# Patient Record
Sex: FEMALE | Race: WHITE | Employment: OTHER
[De-identification: names, ages, dates, MRNs, and addresses within clinical notes are randomized per-mention and may not be internally consistent; named-entity substitution may affect disease eponyms.]

---

## 2018-02-16 PROBLEM — I34.0 MITRAL VALVE INSUFFICIENCY: Status: ACTIVE | Noted: 2018-02-16

## 2018-02-16 PROBLEM — R93.1 ABNORMAL ECHOCARDIOGRAM: Status: ACTIVE | Noted: 2018-02-16

## 2018-02-16 PROBLEM — E78.1 HYPERTRIGLYCERIDEMIA: Status: ACTIVE | Noted: 2018-02-16

## 2018-02-16 PROBLEM — I10 ESSENTIAL HYPERTENSION: Status: ACTIVE | Noted: 2018-02-16

## 2018-02-16 PROBLEM — R00.2 PALPITATION: Status: ACTIVE | Noted: 2018-02-16

## 2018-02-16 PROBLEM — R94.31 ABNORMAL EKG: Status: ACTIVE | Noted: 2018-02-16

## 2018-02-16 PROBLEM — I49.9 CARDIAC ARRHYTHMIA: Status: ACTIVE | Noted: 2018-02-16

## 2023-09-05 ENCOUNTER — HOSPITAL ENCOUNTER (OUTPATIENT)
Dept: MRI IMAGING | Facility: CLINIC | Age: 78
Discharge: HOME | End: 2023-09-07
Payer: COMMERCIAL

## 2023-09-05 DIAGNOSIS — G93.9 DISORDER OF BRAIN: ICD-10-CM

## 2023-09-05 PROCEDURE — 70551 MRI BRAIN STEM W/O DYE: CPT

## 2024-05-09 NOTE — PROGRESS NOTES
[facility]  [address] [place] [id number]  Phone: [phone]       Name: Yuki Winkler  : 1945     Chief Complaint:    Yuki Winkler is a 78 y.o. year old female who presents today for No chief complaint on file.      History of Present Illness:    Patient here to establish care as a new patient.   Previous PCP: ***  Last appt with previous PCP ***  Last preventative visit with previous PCP ***   Specialists: duran Seth   Records reviewed from: ***  OARRS report reviewed: ***    Chronic conditions and associated medications. Taken from my review of the electronic chart, discussion with patient, and any records available to me at the time of visit and prior to the visit:  ***    Acute or new concerns today that the patient has:   ***      Medications:    Outpatient Medications Prior to Visit   Medication Sig Dispense Refill    amLODIPine (NORVASC) 2.5 MG tablet Take 1 tablet by mouth daily 30 tablet 3    Insulin Glargine (BASAGLAR KWIKPEN SC) Inject into the skin      Insulin Aspart (NOVOLOG FLEXPEN SC) Inject into the skin      dicyclomine (BENTYL) 20 MG tablet Take 20 mg by mouth as needed      lisinopril (PRINIVIL;ZESTRIL) 40 MG tablet Take 1 tablet by mouth daily 90 tablet 3    glimepiride (AMARYL) 4 MG tablet Take 4 mg by mouth 2 times daily      pantoprazole (PROTONIX) 40 MG tablet Take 40 mg by mouth daily      nadolol (CORGARD) 20 MG tablet Take 20 mg by mouth daily      b complex vitamins capsule Take 1 capsule by mouth daily       No facility-administered medications prior to visit.       Review of Systems:     Review of Systems     Physical Exam:     Vitals:  There were no vitals taken for this visit. There is no height or weight on file to calculate BMI.    Physical Exam    Assessment:     {No diagnosis found. (Refresh or delete this SmartLink)}          Plan:     {There are no diagnoses linked to this encounter. (Refresh

## 2024-05-10 ENCOUNTER — OFFICE VISIT (OUTPATIENT)
Dept: PRIMARY CARE CLINIC | Age: 79
End: 2024-05-10
Payer: COMMERCIAL

## 2024-05-10 VITALS
HEART RATE: 87 BPM | WEIGHT: 197 LBS | HEIGHT: 61 IN | BODY MASS INDEX: 37.19 KG/M2 | SYSTOLIC BLOOD PRESSURE: 142 MMHG | DIASTOLIC BLOOD PRESSURE: 84 MMHG | OXYGEN SATURATION: 96 %

## 2024-05-10 DIAGNOSIS — E11.9 TYPE 2 DIABETES MELLITUS WITHOUT COMPLICATION, WITH LONG-TERM CURRENT USE OF INSULIN (HCC): Primary | ICD-10-CM

## 2024-05-10 DIAGNOSIS — Z79.4 TYPE 2 DIABETES MELLITUS WITHOUT COMPLICATION, WITH LONG-TERM CURRENT USE OF INSULIN (HCC): Primary | ICD-10-CM

## 2024-05-10 DIAGNOSIS — E83.42 HYPOMAGNESEMIA: ICD-10-CM

## 2024-05-10 DIAGNOSIS — E53.8 B12 DEFICIENCY: ICD-10-CM

## 2024-05-10 DIAGNOSIS — I10 ESSENTIAL HYPERTENSION: ICD-10-CM

## 2024-05-10 PROCEDURE — 1123F ACP DISCUSS/DSCN MKR DOCD: CPT | Performed by: FAMILY MEDICINE

## 2024-05-10 PROCEDURE — 99204 OFFICE O/P NEW MOD 45 MIN: CPT | Performed by: FAMILY MEDICINE

## 2024-05-10 PROCEDURE — 3079F DIAST BP 80-89 MM HG: CPT | Performed by: FAMILY MEDICINE

## 2024-05-10 PROCEDURE — 3077F SYST BP >= 140 MM HG: CPT | Performed by: FAMILY MEDICINE

## 2024-05-10 RX ORDER — AMLODIPINE BESYLATE 5 MG/1
5 TABLET ORAL DAILY
COMMUNITY
Start: 2024-03-06

## 2024-05-10 RX ORDER — PEN NEEDLE, DIABETIC 32GX 5/32"
NEEDLE, DISPOSABLE MISCELLANEOUS
COMMUNITY
Start: 2024-04-01

## 2024-05-10 RX ORDER — IBUPROFEN 200 MG
200 TABLET ORAL EVERY 6 HOURS PRN
COMMUNITY

## 2024-05-10 RX ORDER — ACYCLOVIR 400 MG/1
TABLET ORAL
COMMUNITY
Start: 2024-03-27

## 2024-05-10 RX ORDER — SYRINGE WITH NEEDLE, 1 ML 25GX5/8"
SYRINGE, EMPTY DISPOSABLE MISCELLANEOUS
COMMUNITY
Start: 2024-04-29

## 2024-05-10 RX ORDER — CYANOCOBALAMIN 1000 UG/ML
INJECTION, SOLUTION INTRAMUSCULAR; SUBCUTANEOUS
COMMUNITY
Start: 2024-04-25

## 2024-05-10 RX ORDER — INSULIN GLARGINE 100 [IU]/ML
45 INJECTION, SOLUTION SUBCUTANEOUS NIGHTLY
COMMUNITY
Start: 2024-04-05

## 2024-05-10 RX ORDER — CALCIUM CITRATE/VITAMIN D3 200MG-6.25
TABLET ORAL
COMMUNITY
Start: 2024-04-25

## 2024-05-10 RX ORDER — INSULIN ASPART INJECTION 100 [IU]/ML
INJECTION, SOLUTION SUBCUTANEOUS
COMMUNITY
Start: 2024-04-16

## 2024-05-10 RX ORDER — MELATONIN 10 MG
TABLET, SUBLINGUAL SUBLINGUAL
COMMUNITY

## 2024-05-10 RX ORDER — MAGNESIUM GLUCONATE 27 MG(500)
500 TABLET ORAL 2 TIMES DAILY
COMMUNITY

## 2024-05-10 RX ORDER — LISINOPRIL 10 MG/1
10 TABLET ORAL 2 TIMES DAILY
COMMUNITY
Start: 2024-03-01

## 2024-05-10 SDOH — ECONOMIC STABILITY: FOOD INSECURITY: WITHIN THE PAST 12 MONTHS, YOU WORRIED THAT YOUR FOOD WOULD RUN OUT BEFORE YOU GOT MONEY TO BUY MORE.: NEVER TRUE

## 2024-05-10 SDOH — ECONOMIC STABILITY: INCOME INSECURITY: HOW HARD IS IT FOR YOU TO PAY FOR THE VERY BASICS LIKE FOOD, HOUSING, MEDICAL CARE, AND HEATING?: NOT HARD AT ALL

## 2024-05-10 SDOH — ECONOMIC STABILITY: FOOD INSECURITY: WITHIN THE PAST 12 MONTHS, THE FOOD YOU BOUGHT JUST DIDN'T LAST AND YOU DIDN'T HAVE MONEY TO GET MORE.: NEVER TRUE

## 2024-05-10 SDOH — ECONOMIC STABILITY: HOUSING INSECURITY
IN THE LAST 12 MONTHS, WAS THERE A TIME WHEN YOU DID NOT HAVE A STEADY PLACE TO SLEEP OR SLEPT IN A SHELTER (INCLUDING NOW)?: NO

## 2024-05-10 ASSESSMENT — PATIENT HEALTH QUESTIONNAIRE - PHQ9
SUM OF ALL RESPONSES TO PHQ QUESTIONS 1-9: 0
SUM OF ALL RESPONSES TO PHQ QUESTIONS 1-9: 0
2. FEELING DOWN, DEPRESSED OR HOPELESS: NOT AT ALL
SUM OF ALL RESPONSES TO PHQ QUESTIONS 1-9: 0
1. LITTLE INTEREST OR PLEASURE IN DOING THINGS: NOT AT ALL
SUM OF ALL RESPONSES TO PHQ QUESTIONS 1-9: 0
SUM OF ALL RESPONSES TO PHQ9 QUESTIONS 1 & 2: 0

## 2024-05-10 NOTE — PROGRESS NOTES
[facility]  [address] [place] [id number]  Phone: [phone]       Name: Yuki Winkler  : 1945     Chief Complaint:    Yuki Winkler is a 78 y.o. year old female who presents today for   Chief Complaint   Patient presents with    New Patient     Last PCP-Dr Pop Walker, last seen X 2 weeks ago. Last physical 2023. Specialist- Endo- Dr Rodriguez, Cardiology- Dr Tess Lechuga, GI- Dr Dey          History of Present Illness:    Patient here to establish care as a new patient.   Previous PCP: DR Pop Walker  Last appt with previous PCP 2 weeks ago  Last preventative visit with previous PCP believes around 2023   Specialists: endo - Michael Ochoa, ortho - Rodríguez, cardio - Tess Lechuga, gi - George        Chronic conditions and associated medications. Taken from my review of the electronic chart, discussion with patient, and any records available to me at the time of visit and prior to the visit:    DM-2: basal/bolus insulin regimen, metformin and glimepiride. Last A1c was 8.2%     HTN - amlodipine, lisinopril. No CAD. Did have some heart testing .     Father had colon cancer and she has IBS - sees GI for this. Does admit to some GI complaints and wonders if she should d/w GI.     Mg and B12 - sounds like previous PCP was monitoring/replacing.       Medications:    Outpatient Medications Prior to Visit   Medication Sig Dispense Refill    lisinopril (PRINIVIL;ZESTRIL) 10 MG tablet Take 1 tablet by mouth 2 times daily      BASAGLAR KWIKPEN 100 UNIT/ML injection pen Inject 45 Units into the skin nightly      amLODIPine (NORVASC) 5 MG tablet Take 1 tablet by mouth daily      B-D 3CC LUER-LUCITA SYR 23GX1\" 23G X 1\" 3 ML MISC USE TO INJECT B12 AS DIRECTED      metFORMIN (GLUCOPHAGE) 500 MG tablet 2 tablets      BD PEN NEEDLE PREETHI 2ND GEN 32G X 4 MM MISC USE AS DIRECTED three times a day      FIASP FLEXTOUCH 100 UNIT/ML SOPN inject 5 units subcutaneously

## 2024-05-23 PROBLEM — L90.0 LICHEN SCLEROSUS ET ATROPHICUS: Status: ACTIVE | Noted: 2024-05-23

## 2024-07-22 ENCOUNTER — HOSPITAL ENCOUNTER (OUTPATIENT)
Age: 79
Setting detail: SPECIMEN
Discharge: HOME | End: 2024-07-22

## 2024-07-22 ENCOUNTER — OFFICE VISIT (OUTPATIENT)
Dept: PRIMARY CARE CLINIC | Age: 79
End: 2024-07-22
Payer: COMMERCIAL

## 2024-07-22 VITALS
DIASTOLIC BLOOD PRESSURE: 82 MMHG | WEIGHT: 196 LBS | SYSTOLIC BLOOD PRESSURE: 132 MMHG | BODY MASS INDEX: 37 KG/M2 | HEART RATE: 94 BPM | OXYGEN SATURATION: 97 % | HEIGHT: 61 IN

## 2024-07-22 DIAGNOSIS — R09.82 POST-NASAL DRIP: ICD-10-CM

## 2024-07-22 DIAGNOSIS — E53.8 B12 DEFICIENCY: ICD-10-CM

## 2024-07-22 DIAGNOSIS — R42 DIZZINESS: ICD-10-CM

## 2024-07-22 DIAGNOSIS — E83.42 HYPOMAGNESEMIA: ICD-10-CM

## 2024-07-22 DIAGNOSIS — H53.9 VISION CHANGES: Primary | ICD-10-CM

## 2024-07-22 LAB
MAGNESIUM SERPL-MCNC: 1.9 MG/DL (ref 1.6–2.4)
VIT B12 SERPL-MCNC: 1026 PG/ML (ref 232–1245)

## 2024-07-22 PROCEDURE — 3079F DIAST BP 80-89 MM HG: CPT | Performed by: FAMILY MEDICINE

## 2024-07-22 PROCEDURE — 1123F ACP DISCUSS/DSCN MKR DOCD: CPT | Performed by: FAMILY MEDICINE

## 2024-07-22 PROCEDURE — 3075F SYST BP GE 130 - 139MM HG: CPT | Performed by: FAMILY MEDICINE

## 2024-07-22 PROCEDURE — 99214 OFFICE O/P EST MOD 30 MIN: CPT | Performed by: FAMILY MEDICINE

## 2024-07-22 RX ORDER — LISINOPRIL 20 MG/1
TABLET ORAL
COMMUNITY
Start: 2024-07-12

## 2024-07-22 RX ORDER — CLOBETASOL PROPIONATE 0.5 MG/G
CREAM TOPICAL
Qty: 45 G | Refills: 2 | Status: SHIPPED | OUTPATIENT
Start: 2024-07-22

## 2024-07-22 RX ORDER — NYSTATIN 100000 [USP'U]/G
POWDER TOPICAL
Qty: 30 G | Refills: 2 | Status: SHIPPED | OUTPATIENT
Start: 2024-07-22

## 2024-07-22 RX ORDER — AMLODIPINE BESYLATE 2.5 MG/1
2.5 TABLET ORAL EVERY MORNING
COMMUNITY
Start: 2024-06-03

## 2024-07-22 NOTE — PROGRESS NOTES
1000 MCG ) intramuscularly Every Month      Continuous Glucose Sensor (DEXCOM G7 SENSOR) MISC USE TO CHECK BLOOD SUGAR. CHANGE EVERY 10 DAYS      Continuous Glucose  (DEXCOM G7 ) CYNTHIA USE TO CHECK BLOOD SUGAR      magnesium gluconate (MAGONATE) 500 MG tablet Take 1 tablet by mouth 2 times daily      Cholecalciferol (VITAMIN D3) 250 MCG (81437 UT) TABS Take by mouth      ibuprofen (ADVIL;MOTRIN) 200 MG tablet Take 1 tablet by mouth every 6 hours as needed for Pain      diphenhydrAMINE-APAP, sleep, (TYLENOL PM EXTRA STRENGTH PO) Take by mouth      Insulin Aspart (NOVOLOG FLEXPEN SC) Inject into the skin      glimepiride (AMARYL) 4 MG tablet Take 1 tablet by mouth in the morning, at noon, and at bedtime      pantoprazole (PROTONIX) 40 MG tablet Take 1 tablet by mouth daily      b complex vitamins capsule Take 1 capsule by mouth daily      lisinopril (PRINIVIL;ZESTRIL) 10 MG tablet Take 1 tablet by mouth 2 times daily       No facility-administered medications prior to visit.       Review of Systems:     Review of Systems     Physical Exam:     Vitals:  /82 (Site: Right Upper Arm, Position: Sitting, Cuff Size: Small Adult)   Pulse 94   Ht 1.549 m (5' 1\")   Wt 88.9 kg (196 lb)   LMP  (LMP Unknown)   SpO2 97%   BMI 37.03 kg/m²  Body mass index is 37.03 kg/m².    Physical Exam  Vitals and nursing note reviewed.   Constitutional:       Appearance: Normal appearance.   Neck:      Vascular: No carotid bruit.   Cardiovascular:      Rate and Rhythm: Normal rate and regular rhythm.      Heart sounds: Normal heart sounds.   Pulmonary:      Effort: Pulmonary effort is normal.      Breath sounds: Normal breath sounds.   Neurological:      General: No focal deficit present.      Mental Status: She is alert.   Psychiatric:         Mood and Affect: Mood normal.         Behavior: Behavior normal.         Assessment:      Diagnosis Orders   1. Vision changes  74238 - [place] Duplex Scan Extracranial, Chris      2.

## 2024-08-05 ENCOUNTER — TELEPHONE (OUTPATIENT)
Dept: PRIMARY CARE CLINIC | Age: 79
End: 2024-08-05

## 2024-08-05 DIAGNOSIS — R42 DIZZINESS: ICD-10-CM

## 2024-08-05 DIAGNOSIS — H53.9 VISION CHANGES: Primary | ICD-10-CM

## 2024-08-05 NOTE — TELEPHONE ENCOUNTER
Last appt 07/22/24  Next appt 11/13/24      Pt called for lab results and advised of all normal results.    Pt also states she should have an order for Carotid ultrasound [facility]    Please advise. Thank you.

## 2024-08-12 ENCOUNTER — HOSPITAL ENCOUNTER (OUTPATIENT)
Dept: VASCULAR LAB | Age: 79
Discharge: HOME | End: 2024-08-14
Payer: COMMERCIAL

## 2024-08-12 DIAGNOSIS — H53.9 VISION CHANGES: ICD-10-CM

## 2024-08-12 DIAGNOSIS — R42 DIZZINESS: ICD-10-CM

## 2024-08-12 LAB
VAS LEFT BULB EDV: 13.4 CM/S
VAS LEFT BULB PSV: 56.4 CM/S
VAS LEFT CCA DIST EDV: 12.2 CM/S
VAS LEFT CCA DIST PSV: 79.7 CM/S
VAS LEFT CCA PROX EDV: 13.4 CM/S
VAS LEFT CCA PROX PSV: 101.8 CM/S
VAS LEFT ECA EDV: 8.9 CM/S
VAS LEFT ECA PSV: 142.3 CM/S
VAS LEFT ICA DIST EDV: 10.8 CM/S
VAS LEFT ICA DIST PSV: 94.8 CM/S
VAS LEFT ICA PROX EDV: 7.1 CM/S
VAS LEFT ICA PROX PSV: 72.9 CM/S
VAS LEFT ICA/CCA PSV: 1.2 NO UNITS
VAS LEFT VERTEBRAL EDV: 9.8 CM/S
VAS LEFT VERTEBRAL PSV: 47.2 CM/S
VAS RIGHT BULB EDV: 12.2 CM/S
VAS RIGHT BULB PSV: 60 CM/S
VAS RIGHT CCA DIST EDV: 13.4 CM/S
VAS RIGHT CCA DIST PSV: 74.8 CM/S
VAS RIGHT CCA PROX EDV: 15.8 CM/S
VAS RIGHT CCA PROX PSV: 87.1 CM/S
VAS RIGHT ECA EDV: 7.1 CM/S
VAS RIGHT ECA PSV: 107.6 CM/S
VAS RIGHT ICA DIST EDV: 15 CM/S
VAS RIGHT ICA DIST PSV: 81.2 CM/S
VAS RIGHT ICA PROX EDV: 9.4 CM/S
VAS RIGHT ICA PROX PSV: 48.1 CM/S
VAS RIGHT ICA/CCA PSV: 1.1 NO UNITS
VAS RIGHT VERTEBRAL EDV: 8.5 CM/S
VAS RIGHT VERTEBRAL PSV: 49 CM/S

## 2024-08-12 PROCEDURE — 93880 EXTRACRANIAL BILAT STUDY: CPT

## 2024-08-12 PROCEDURE — 93880 EXTRACRANIAL BILAT STUDY: CPT | Performed by: SURGERY

## 2024-09-18 ENCOUNTER — HOSPITAL ENCOUNTER (OUTPATIENT)
Dept: MRI IMAGING | Facility: CLINIC | Age: 79
Discharge: HOME | End: 2024-09-20
Payer: COMMERCIAL

## 2024-09-18 DIAGNOSIS — M84.48XA PATHOLOGICAL FRACTURE, OTHER SITE, INITIAL ENCOUNTER FOR FRACTURE: ICD-10-CM

## 2024-09-18 PROCEDURE — 72195 MRI PELVIS W/O DYE: CPT

## 2024-12-13 ENCOUNTER — OFFICE VISIT (OUTPATIENT)
Dept: PRIMARY CARE CLINIC | Age: 79
End: 2024-12-13

## 2024-12-13 ENCOUNTER — HOSPITAL ENCOUNTER (OUTPATIENT)
Age: 79
Setting detail: SPECIMEN
Discharge: HOME | End: 2024-12-13

## 2024-12-13 VITALS
SYSTOLIC BLOOD PRESSURE: 126 MMHG | HEIGHT: 61 IN | BODY MASS INDEX: 37 KG/M2 | WEIGHT: 196 LBS | HEART RATE: 82 BPM | DIASTOLIC BLOOD PRESSURE: 80 MMHG | OXYGEN SATURATION: 97 %

## 2024-12-13 DIAGNOSIS — Z00.00 MEDICARE ANNUAL WELLNESS VISIT, SUBSEQUENT: Primary | ICD-10-CM

## 2024-12-13 DIAGNOSIS — E61.1 IRON DEFICIENCY: ICD-10-CM

## 2024-12-13 DIAGNOSIS — E55.9 VITAMIN D DEFICIENCY: ICD-10-CM

## 2024-12-13 DIAGNOSIS — G62.9 NEUROPATHY: ICD-10-CM

## 2024-12-13 DIAGNOSIS — K76.0 FATTY LIVER: ICD-10-CM

## 2024-12-13 LAB
25(OH)D3 SERPL-MCNC: 43.5 NG/ML (ref 30–100)
ALBUMIN SERPL-MCNC: 4.2 G/DL (ref 3.5–5.2)
ALBUMIN/GLOB SERPL: 1.4 {RATIO} (ref 1–2.5)
ALP SERPL-CCNC: 114 U/L (ref 35–104)
ALT SERPL-CCNC: 12 U/L (ref 10–35)
ANION GAP SERPL CALCULATED.3IONS-SCNC: 11 MMOL/L (ref 9–16)
AST SERPL-CCNC: 19 U/L (ref 10–35)
BASOPHILS # BLD: 0.09 K/UL (ref 0–0.2)
BASOPHILS NFR BLD: 1 % (ref 0–2)
BILIRUB DIRECT SERPL-MCNC: 0.2 MG/DL (ref 0–0.2)
BILIRUB INDIRECT SERPL-MCNC: 0.2 MG/DL (ref 0–1)
BILIRUB SERPL-MCNC: 0.4 MG/DL (ref 0–1.2)
BUN SERPL-MCNC: 11 MG/DL (ref 8–23)
CALCIUM SERPL-MCNC: 9.5 MG/DL (ref 8.6–10.4)
CHLORIDE SERPL-SCNC: 101 MMOL/L (ref 98–107)
CO2 SERPL-SCNC: 27 MMOL/L (ref 20–31)
CREAT SERPL-MCNC: 0.7 MG/DL (ref 0.6–0.9)
EOSINOPHIL # BLD: 0.15 K/UL (ref 0–0.44)
EOSINOPHILS RELATIVE PERCENT: 1 % (ref 1–4)
ERYTHROCYTE [DISTWIDTH] IN BLOOD BY AUTOMATED COUNT: 13.8 % (ref 11.8–14.4)
GFR, ESTIMATED: 87 ML/MIN/1.73M2
GLOBULIN SER CALC-MCNC: 2.9 G/DL
GLUCOSE SERPL-MCNC: 175 MG/DL (ref 74–99)
HCT VFR BLD AUTO: 46 % (ref 36.3–47.1)
HGB BLD-MCNC: 14.5 G/DL (ref 11.9–15.1)
IMM GRANULOCYTES # BLD AUTO: 0.04 K/UL (ref 0–0.3)
IMM GRANULOCYTES NFR BLD: 0 %
IRON SATN MFR SERPL: 23 % (ref 20–55)
IRON SERPL-MCNC: 82 UG/DL (ref 37–145)
LYMPHOCYTES NFR BLD: 4.31 K/UL (ref 1.1–3.7)
LYMPHOCYTES RELATIVE PERCENT: 32 % (ref 24–43)
MAGNESIUM SERPL-MCNC: 2 MG/DL (ref 1.6–2.4)
MCH RBC QN AUTO: 29.7 PG (ref 25.2–33.5)
MCHC RBC AUTO-ENTMCNC: 31.5 G/DL (ref 28.4–34.8)
MCV RBC AUTO: 94.1 FL (ref 82.6–102.9)
MONOCYTES NFR BLD: 1.03 K/UL (ref 0.1–1.2)
MONOCYTES NFR BLD: 8 % (ref 3–12)
NEUTROPHILS NFR BLD: 58 % (ref 36–65)
NEUTS SEG NFR BLD: 7.84 K/UL (ref 1.5–8.1)
NRBC BLD-RTO: 0 PER 100 WBC
PLATELET # BLD AUTO: 441 K/UL (ref 138–453)
PMV BLD AUTO: 10.3 FL (ref 8.1–13.5)
POTASSIUM SERPL-SCNC: 4.4 MMOL/L (ref 3.7–5.3)
PROT SERPL-MCNC: 7.1 G/DL (ref 6.6–8.7)
RBC # BLD AUTO: 4.89 M/UL (ref 3.95–5.11)
SODIUM SERPL-SCNC: 139 MMOL/L (ref 136–145)
TIBC SERPL-MCNC: 356 UG/DL (ref 250–450)
UNSATURATED IRON BINDING CAPACITY: 274 UG/DL (ref 112–347)
VIT B12 SERPL-MCNC: 540 PG/ML (ref 232–1245)
WBC OTHER # BLD: 13.5 K/UL (ref 3.5–11.3)

## 2024-12-13 ASSESSMENT — PATIENT HEALTH QUESTIONNAIRE - PHQ9
SUM OF ALL RESPONSES TO PHQ QUESTIONS 1-9: 0
2. FEELING DOWN, DEPRESSED OR HOPELESS: NOT AT ALL
SUM OF ALL RESPONSES TO PHQ QUESTIONS 1-9: 0
1. LITTLE INTEREST OR PLEASURE IN DOING THINGS: NOT AT ALL
SUM OF ALL RESPONSES TO PHQ9 QUESTIONS 1 & 2: 0
SUM OF ALL RESPONSES TO PHQ QUESTIONS 1-9: 0
SUM OF ALL RESPONSES TO PHQ QUESTIONS 1-9: 0

## 2024-12-13 ASSESSMENT — LIFESTYLE VARIABLES
HOW MANY STANDARD DRINKS CONTAINING ALCOHOL DO YOU HAVE ON A TYPICAL DAY: PATIENT DOES NOT DRINK
HOW OFTEN DO YOU HAVE A DRINK CONTAINING ALCOHOL: NEVER

## 2024-12-13 NOTE — PROGRESS NOTES
Cognitive:   Clock Drawing Test (CDT): Normal  Words recalled: 0 Words Recalled  Total Score: (!) 2  Total Score Interpretation: Abnormal Mini-Cog  Interventions:  Patient comments: 26/30 MMMSE             Inactivity:  On average, how many days per week do you engage in moderate to strenuous exercise (like a brisk walk)?: 0 days (!) Abnormal  On average, how many minutes do you engage in exercise at this level?: 0 min  Interventions:  See AVS for additional education material     Abnormal BMI (obese):  Body mass index is 37.03 kg/m². (!) Abnormal  Interventions:  See AVS for additional education material        Dentist Screen:  Have you seen the dentist within the past year?: (!) No    Intervention:  Advised to schedule with their dentist                      Objective   Vitals:    12/13/24 1121   BP: 126/80   Site: Left Upper Arm   Position: Sitting   Cuff Size: Small Adult   Pulse: 82   SpO2: 97%   Weight: 88.9 kg (196 lb)   Height: 1.549 m (5' 1\")      Body mass index is 37.03 kg/m².        Physical Exam  Vitals and nursing note reviewed.   Constitutional:       Appearance: Normal appearance.   Cardiovascular:      Rate and Rhythm: Normal rate and regular rhythm.      Heart sounds: Normal heart sounds.   Pulmonary:      Effort: Pulmonary effort is normal.      Breath sounds: Normal breath sounds.   Neurological:      Mental Status: She is alert.   Psychiatric:         Mood and Affect: Mood normal.         Behavior: Behavior normal.                 Allergies   Allergen Reactions    Cardizem [Diltiazem] Hives    Fenofibrate Nausea Only    Meperidine Nausea And Vomiting     Other reaction(s): Vomiting    Shrimp Extract Swelling     Pt is able to eat a small portion of shrimp only.    Other reaction(s): Facial Swelling    Atenolol-Chlorthalidone Other (See Comments)    Lipitor [Atorvastatin] Other (See Comments)     Myalgias     Metoprolol Other (See Comments)    Sitagliptin Other (See Comments)

## 2024-12-13 NOTE — PATIENT INSTRUCTIONS
person you are caring for to brush and floss their teeth or to clean their dentures. In some cases, you may need to do the brushing and other dental care tasks. People who have trouble using their hands or who have dementia may need this extra help.  How can you help with dental care?  Normal dental care  To keep the teeth and gums healthy:  Brush the teeth with fluoride toothpaste twice a day--in the morning and at night--and floss at least once a day. Plaque can quickly build up on the teeth of older adults.  Watch for the signs of gum disease. These signs include gums that bleed after brushing or after eating hard foods, such as apples.  See a dentist regularly. Many experts recommend checkups every 6 months.  Keep the dentist up to date on any new medications the person is taking.  Encourage a balanced diet that includes whole grains, vegetables, and fruits, and that is low in saturated fat and sodium.  Encourage the person you're caring for not to use tobacco products. They can affect dental and general health.  Many older adults have a fixed income and feel that they can't afford dental care. But most [facility] and [facility] have programs in which dentists help older adults by lowering fees. Contact your area's public health offices or  for information about dental care in your area.  Using a toothbrush  Older adults with arthritis sometimes have trouble brushing their teeth because they can't easily hold the toothbrush. Their hands and fingers may be stiff, painful, or weak. If this is the case, you can:  Offer an electric toothbrush.  Enlarge the handle of a non-electric toothbrush by wrapping a sponge, an elastic bandage, or adhesive tape around it.  Push the toothbrush handle through a ball made of rubber or soft foam.  Make the handle longer and thicker by taping Popsicle sticks or tongue depressors to it.  You may also be able to buy special toothbrushes, toothpaste dispensers, and floss

## 2024-12-18 RX ORDER — PANTOPRAZOLE SODIUM 40 MG/1
40 TABLET, DELAYED RELEASE ORAL DAILY
Qty: 90 TABLET | Refills: 1 | Status: SHIPPED | OUTPATIENT
Start: 2024-12-18

## 2024-12-18 NOTE — TELEPHONE ENCOUNTER
Her prescription from [facility] never transferred to [facility] so she is requesting a new order sent to them.  Last oni 12/13  Next oni 3/14

## 2024-12-19 ENCOUNTER — HOSPITAL ENCOUNTER (OUTPATIENT)
Age: 79
Setting detail: SPECIMEN
Discharge: HOME | End: 2024-12-19

## 2024-12-19 DIAGNOSIS — R74.8 ELEVATED ALKALINE PHOSPHATASE LEVEL: ICD-10-CM

## 2024-12-19 DIAGNOSIS — D72.828 OTHER ELEVATED WHITE BLOOD CELL (WBC) COUNT: ICD-10-CM

## 2024-12-19 DIAGNOSIS — D72.828 OTHER ELEVATED WHITE BLOOD CELL (WBC) COUNT: Primary | ICD-10-CM

## 2024-12-19 LAB
ALBUMIN SERPL-MCNC: 4.4 G/DL (ref 3.5–5.2)
ALBUMIN/GLOB SERPL: 1.8 {RATIO} (ref 1–2.5)
ALP SERPL-CCNC: 107 U/L (ref 35–104)
ALT SERPL-CCNC: 12 U/L (ref 10–35)
AST SERPL-CCNC: 20 U/L (ref 10–35)
BASOPHILS # BLD: 0.11 K/UL (ref 0–0.2)
BASOPHILS NFR BLD: 1 % (ref 0–2)
BILIRUB DIRECT SERPL-MCNC: 0.2 MG/DL (ref 0–0.2)
BILIRUB INDIRECT SERPL-MCNC: 0.2 MG/DL (ref 0–1)
BILIRUB SERPL-MCNC: 0.4 MG/DL (ref 0–1.2)
CRP SERPL HS-MCNC: 10.7 MG/L (ref 0–5)
EOSINOPHIL # BLD: 0.14 K/UL (ref 0–0.44)
EOSINOPHILS RELATIVE PERCENT: 1 % (ref 1–4)
ERYTHROCYTE [DISTWIDTH] IN BLOOD BY AUTOMATED COUNT: 13.9 % (ref 11.8–14.4)
ERYTHROCYTE [SEDIMENTATION RATE] IN BLOOD BY PHOTOMETRIC METHOD: 11 MM/HR (ref 0–30)
GGT SERPL-CCNC: 20 U/L (ref 5–36)
GLOBULIN SER CALC-MCNC: 2.4 G/DL
HCT VFR BLD AUTO: 44.9 % (ref 36.3–47.1)
HGB BLD-MCNC: 14 G/DL (ref 11.9–15.1)
IMM GRANULOCYTES # BLD AUTO: <0.03 K/UL (ref 0–0.3)
IMM GRANULOCYTES NFR BLD: 0 %
LYMPHOCYTES NFR BLD: 4.41 K/UL (ref 1.1–3.7)
LYMPHOCYTES RELATIVE PERCENT: 34 % (ref 24–43)
MCH RBC QN AUTO: 29.4 PG (ref 25.2–33.5)
MCHC RBC AUTO-ENTMCNC: 31.2 G/DL (ref 28.4–34.8)
MCV RBC AUTO: 94.1 FL (ref 82.6–102.9)
MONOCYTES NFR BLD: 1.06 K/UL (ref 0.1–1.2)
MONOCYTES NFR BLD: 8 % (ref 3–12)
NEUTROPHILS NFR BLD: 56 % (ref 36–65)
NEUTS SEG NFR BLD: 7.39 K/UL (ref 1.5–8.1)
NRBC BLD-RTO: 0 PER 100 WBC
PLATELET # BLD AUTO: 416 K/UL (ref 138–453)
PMV BLD AUTO: 10.2 FL (ref 8.1–13.5)
PROT SERPL-MCNC: 6.8 G/DL (ref 6.6–8.7)
RBC # BLD AUTO: 4.77 M/UL (ref 3.95–5.11)
WBC OTHER # BLD: 13.1 K/UL (ref 3.5–11.3)

## 2024-12-20 DIAGNOSIS — D72.829 LEUKOCYTOSIS, UNSPECIFIED TYPE: Primary | ICD-10-CM

## 2025-02-21 ENCOUNTER — OFFICE VISIT (OUTPATIENT)
Dept: PRIMARY CARE CLINIC | Age: 80
End: 2025-02-21
Payer: COMMERCIAL

## 2025-02-21 VITALS
DIASTOLIC BLOOD PRESSURE: 60 MMHG | BODY MASS INDEX: 36.25 KG/M2 | HEART RATE: 87 BPM | RESPIRATION RATE: 18 BRPM | HEIGHT: 61 IN | WEIGHT: 192 LBS | OXYGEN SATURATION: 98 % | SYSTOLIC BLOOD PRESSURE: 130 MMHG

## 2025-02-21 DIAGNOSIS — M79.2 NERVE PAIN: Primary | ICD-10-CM

## 2025-02-21 DIAGNOSIS — I10 ESSENTIAL HYPERTENSION: ICD-10-CM

## 2025-02-21 DIAGNOSIS — R42 DIZZINESS: ICD-10-CM

## 2025-02-21 PROCEDURE — 1159F MED LIST DOCD IN RCRD: CPT | Performed by: FAMILY MEDICINE

## 2025-02-21 PROCEDURE — 3078F DIAST BP <80 MM HG: CPT | Performed by: FAMILY MEDICINE

## 2025-02-21 PROCEDURE — 99214 OFFICE O/P EST MOD 30 MIN: CPT | Performed by: FAMILY MEDICINE

## 2025-02-21 PROCEDURE — 1125F AMNT PAIN NOTED PAIN PRSNT: CPT | Performed by: FAMILY MEDICINE

## 2025-02-21 PROCEDURE — 1123F ACP DISCUSS/DSCN MKR DOCD: CPT | Performed by: FAMILY MEDICINE

## 2025-02-21 PROCEDURE — 3075F SYST BP GE 130 - 139MM HG: CPT | Performed by: FAMILY MEDICINE

## 2025-02-21 RX ORDER — GABAPENTIN 100 MG/1
100 CAPSULE ORAL 2 TIMES DAILY
Qty: 60 CAPSULE | Refills: 0 | Status: SHIPPED | OUTPATIENT
Start: 2025-02-21 | End: 2025-03-23

## 2025-02-21 SDOH — ECONOMIC STABILITY: FOOD INSECURITY: WITHIN THE PAST 12 MONTHS, YOU WORRIED THAT YOUR FOOD WOULD RUN OUT BEFORE YOU GOT MONEY TO BUY MORE.: NEVER TRUE

## 2025-02-21 SDOH — ECONOMIC STABILITY: FOOD INSECURITY: WITHIN THE PAST 12 MONTHS, THE FOOD YOU BOUGHT JUST DIDN'T LAST AND YOU DIDN'T HAVE MONEY TO GET MORE.: NEVER TRUE

## 2025-02-21 ASSESSMENT — PATIENT HEALTH QUESTIONNAIRE - PHQ9
2. FEELING DOWN, DEPRESSED OR HOPELESS: SEVERAL DAYS
SUM OF ALL RESPONSES TO PHQ QUESTIONS 1-9: 1
1. LITTLE INTEREST OR PLEASURE IN DOING THINGS: NOT AT ALL
SUM OF ALL RESPONSES TO PHQ QUESTIONS 1-9: 1
SUM OF ALL RESPONSES TO PHQ9 QUESTIONS 1 & 2: 1

## 2025-02-21 NOTE — PROGRESS NOTES
[facility]  [address] [place] [address]  Phone: [phone]       Name: Yuki Winkler  : 1945     Chief Complaint:    Yuki Winkler is a 79 y.o. year old female who presents today for   Chief Complaint   Patient presents with    Abdominal Pain     Left side x 3 weeks   No loose stools   No vomiting   No rash          History of Present Illness:      Subjective   History of Present Illness  The patient presents for evaluation of nerve pain.    She underwent a colonoscopy and endoscopy yesterday, which revealed no abnormalities except for a few polyps in the stomach. She has a long-standing history of diverticulitis. She reports no current symptoms of loose stools or vomiting. She experienced constipation followed by diarrhea, but these symptoms have since resolved.     She initially suspected a bowel-related issue, but now believes it to be separate from her current pain. She has been experiencing symptoms for 3 weeks. The pain, described as stabbing and burning, began in one location (mid/low abdomen) and has since spread under her breast, causing discomfort when wearing a bra. She also reports itching but has not noticed any rash. Her son, who resides with her, has not observed any rash on her back. She reports no recent injuries. She describes her skin as tender and expresses concern about potential shingles. She has been taking Tylenol without relief. She has a history of back pain due to metal implants but reports no recent changes or worsening of this condition. She was previously prescribed gabapentin for her back issues but does not recall its effectiveness.     She reports feeling dizzy and off balance following anesthesia administered during her procedures yesterday. She has experienced similar symptoms in the past but not to this extent. She consumes sugar-free drinks regularly.    Medications:    Outpatient Medications Prior to Visit   Medication

## 2025-02-24 ENCOUNTER — TELEPHONE (OUTPATIENT)
Dept: PRIMARY CARE CLINIC | Age: 80
End: 2025-02-24

## 2025-02-24 NOTE — TELEPHONE ENCOUNTER
Patient called office in regard to being seen last week states she was informed by PCP if she was not doing any better on current medication Gabapentin to contact office and she would be prescribed Valacyclovir.       Please Advise

## 2025-02-24 NOTE — TELEPHONE ENCOUNTER
Sorry for the misunderstanding - it was not for valtrex. It was so I could increase her gabapentin. She can take 200mg (2 tablets) BID. If that is not helping symptoms in a few days - will need re-evaluation.

## 2025-02-27 NOTE — PROGRESS NOTES
Apply topically 2 times daily. 45 g 2    nystatin (MYCOSTATIN) 138326 UNIT/GM powder Apply 3 times daily. 30 g 2    BASAGLAR KWIKPEN 100 UNIT/ML injection pen Inject 45 Units into the skin nightly      amLODIPine (NORVASC) 5 MG tablet Take 1 tablet by mouth daily      B-D 3CC LUER-LUCITA SYR 23GX1\" 23G X 1\" 3 ML MISC USE TO INJECT B12 AS DIRECTED      metFORMIN (GLUCOPHAGE) 500 MG tablet 1 tablet 2 times daily (with meals)      BD PEN NEEDLE PREETHI 2ND GEN 32G X 4 MM MISC USE AS DIRECTED three times a day      FIASP FLEXTOUCH 100 UNIT/ML SOPN inject 5 units subcutaneously BEFORE LUNCH and 10 units BEFORE DI...  (REFER TO PRESCRIPTION NOTES).      TRUE METRIX BLOOD GLUCOSE TEST strip TEST three times a day      cyanocobalamin 1000 MCG/ML injection inject 1 milliliter ( 1000 MCG ) intramuscularly Every Month      Continuous Glucose Sensor (DEXCOM G7 SENSOR) MISC USE TO CHECK BLOOD SUGAR. CHANGE EVERY 10 DAYS      Continuous Glucose  (DEXCOM G7 ) CYNTHIA USE TO CHECK BLOOD SUGAR      magnesium gluconate (MAGONATE) 500 MG tablet Take 1 tablet by mouth 2 times daily      Cholecalciferol (VITAMIN D3) 250 MCG (60931 UT) TABS Take by mouth      ibuprofen (ADVIL;MOTRIN) 200 MG tablet Take 1 tablet by mouth every 6 hours as needed for Pain      diphenhydrAMINE-APAP, sleep, (TYLENOL PM EXTRA STRENGTH PO) Take by mouth      Insulin Aspart (NOVOLOG FLEXPEN SC) Inject into the skin      glimepiride (AMARYL) 4 MG tablet Take 1 tablet by mouth in the morning, at noon, and at bedtime      b complex vitamins capsule Take 1 capsule by mouth daily      gabapentin (NEURONTIN) 100 MG capsule Take 1 capsule by mouth 2 times daily for 30 days. Intended supply: 90 days 60 capsule 0     No facility-administered medications prior to visit.       Review of Systems:     Review of Systems     Physical Exam:     Vitals:  /88 (Site: Left Upper Arm, Position: Sitting, Cuff Size: Large Adult)   Pulse 81   Resp 18   Ht 1.549 m (5'

## 2025-03-03 ENCOUNTER — HOSPITAL ENCOUNTER (OUTPATIENT)
Dept: CT IMAGING | Facility: CLINIC | Age: 80
Discharge: HOME | End: 2025-03-05
Payer: COMMERCIAL

## 2025-03-03 ENCOUNTER — HOSPITAL ENCOUNTER (OUTPATIENT)
Age: 80
Setting detail: SPECIMEN
Discharge: HOME | End: 2025-03-03

## 2025-03-03 ENCOUNTER — OFFICE VISIT (OUTPATIENT)
Dept: PRIMARY CARE CLINIC | Age: 80
End: 2025-03-03
Payer: COMMERCIAL

## 2025-03-03 VITALS
HEIGHT: 61 IN | OXYGEN SATURATION: 97 % | WEIGHT: 192 LBS | HEART RATE: 81 BPM | SYSTOLIC BLOOD PRESSURE: 138 MMHG | RESPIRATION RATE: 18 BRPM | DIASTOLIC BLOOD PRESSURE: 88 MMHG | BODY MASS INDEX: 36.25 KG/M2

## 2025-03-03 DIAGNOSIS — M79.2 NERVE PAIN: ICD-10-CM

## 2025-03-03 DIAGNOSIS — R10.84 GENERALIZED ABDOMINAL PAIN: Primary | ICD-10-CM

## 2025-03-03 DIAGNOSIS — R10.84 GENERALIZED ABDOMINAL PAIN: ICD-10-CM

## 2025-03-03 DIAGNOSIS — M79.2 NERVE PAIN: Primary | ICD-10-CM

## 2025-03-03 LAB
ALBUMIN SERPL-MCNC: 4.2 G/DL (ref 3.5–5.2)
ALBUMIN/GLOB SERPL: 1.5 {RATIO} (ref 1–2.5)
ALP SERPL-CCNC: 111 U/L (ref 35–104)
ALT SERPL-CCNC: 16 U/L (ref 10–35)
ANION GAP SERPL CALCULATED.3IONS-SCNC: 11 MMOL/L (ref 9–16)
AST SERPL-CCNC: 23 U/L (ref 10–35)
BASOPHILS # BLD: 0.12 K/UL (ref 0–0.2)
BASOPHILS NFR BLD: 1 % (ref 0–2)
BILIRUB DIRECT SERPL-MCNC: 0.1 MG/DL (ref 0–0.2)
BILIRUB INDIRECT SERPL-MCNC: 0.2 MG/DL (ref 0–1)
BILIRUB SERPL-MCNC: 0.3 MG/DL (ref 0–1.2)
BUN SERPL-MCNC: 9 MG/DL (ref 8–23)
CALCIUM SERPL-MCNC: 9.5 MG/DL (ref 8.6–10.4)
CHLORIDE SERPL-SCNC: 101 MMOL/L (ref 98–107)
CO2 SERPL-SCNC: 28 MMOL/L (ref 20–31)
CREAT SERPL-MCNC: 0.7 MG/DL (ref 0.6–0.9)
EOSINOPHIL # BLD: 0.17 K/UL (ref 0–0.44)
EOSINOPHILS RELATIVE PERCENT: 1 % (ref 1–4)
ERYTHROCYTE [DISTWIDTH] IN BLOOD BY AUTOMATED COUNT: 13.8 % (ref 11.8–14.4)
GFR, ESTIMATED: 87 ML/MIN/1.73M2
GLOBULIN SER CALC-MCNC: 2.8 G/DL
GLUCOSE SERPL-MCNC: 145 MG/DL (ref 74–99)
HCT VFR BLD AUTO: 46.5 % (ref 36.3–47.1)
HGB BLD-MCNC: 14.6 G/DL (ref 11.9–15.1)
IMM GRANULOCYTES # BLD AUTO: 0.04 K/UL (ref 0–0.3)
IMM GRANULOCYTES NFR BLD: 0 %
LIPASE SERPL-CCNC: 27 U/L (ref 13–60)
LYMPHOCYTES NFR BLD: 4.83 K/UL (ref 1.1–3.7)
LYMPHOCYTES RELATIVE PERCENT: 41 % (ref 24–43)
MCH RBC QN AUTO: 29.4 PG (ref 25.2–33.5)
MCHC RBC AUTO-ENTMCNC: 31.4 G/DL (ref 28.4–34.8)
MCV RBC AUTO: 93.6 FL (ref 82.6–102.9)
MONOCYTES NFR BLD: 0.95 K/UL (ref 0.1–1.2)
MONOCYTES NFR BLD: 8 % (ref 3–12)
NEUTROPHILS NFR BLD: 49 % (ref 36–65)
NEUTS SEG NFR BLD: 5.78 K/UL (ref 1.5–8.1)
NRBC BLD-RTO: 0 PER 100 WBC
PLATELET # BLD AUTO: 401 K/UL (ref 138–453)
PMV BLD AUTO: 10.6 FL (ref 8.1–13.5)
POTASSIUM SERPL-SCNC: 4.8 MMOL/L (ref 3.7–5.3)
PROT SERPL-MCNC: 7 G/DL (ref 6.6–8.7)
RBC # BLD AUTO: 4.97 M/UL (ref 3.95–5.11)
SODIUM SERPL-SCNC: 140 MMOL/L (ref 136–145)
WBC OTHER # BLD: 11.9 K/UL (ref 3.5–11.3)

## 2025-03-03 PROCEDURE — 3075F SYST BP GE 130 - 139MM HG: CPT | Performed by: FAMILY MEDICINE

## 2025-03-03 PROCEDURE — 1125F AMNT PAIN NOTED PAIN PRSNT: CPT | Performed by: FAMILY MEDICINE

## 2025-03-03 PROCEDURE — 1123F ACP DISCUSS/DSCN MKR DOCD: CPT | Performed by: FAMILY MEDICINE

## 2025-03-03 PROCEDURE — 1159F MED LIST DOCD IN RCRD: CPT | Performed by: FAMILY MEDICINE

## 2025-03-03 PROCEDURE — 74176 CT ABD & PELVIS W/O CONTRAST: CPT

## 2025-03-03 PROCEDURE — 3079F DIAST BP 80-89 MM HG: CPT | Performed by: FAMILY MEDICINE

## 2025-03-03 PROCEDURE — 99214 OFFICE O/P EST MOD 30 MIN: CPT | Performed by: FAMILY MEDICINE

## 2025-03-03 RX ORDER — GABAPENTIN 300 MG/1
300 CAPSULE ORAL 2 TIMES DAILY
Qty: 60 CAPSULE | Refills: 0 | Status: SHIPPED | OUTPATIENT
Start: 2025-03-03 | End: 2025-04-02

## 2025-03-03 RX ORDER — TRAMADOL HYDROCHLORIDE 50 MG/1
50 TABLET ORAL EVERY 6 HOURS PRN
Qty: 12 TABLET | Refills: 0 | Status: SHIPPED | OUTPATIENT
Start: 2025-03-03 | End: 2025-03-06

## 2025-03-04 DIAGNOSIS — G62.9 NEUROPATHY: Primary | ICD-10-CM

## 2025-03-04 DIAGNOSIS — M48.061 SPINAL STENOSIS OF LUMBAR REGION, UNSPECIFIED WHETHER NEUROGENIC CLAUDICATION PRESENT: ICD-10-CM

## 2025-03-05 ENCOUNTER — TELEPHONE (OUTPATIENT)
Dept: PRIMARY CARE CLINIC | Age: 80
End: 2025-03-05

## 2025-03-05 ENCOUNTER — HOSPITAL ENCOUNTER (OUTPATIENT)
Facility: CLINIC | Age: 80
Discharge: HOME | End: 2025-03-07
Payer: COMMERCIAL

## 2025-03-05 ENCOUNTER — HOSPITAL ENCOUNTER (OUTPATIENT)
Dept: GENERAL RADIOLOGY | Facility: CLINIC | Age: 80
Discharge: HOME | End: 2025-03-07
Payer: COMMERCIAL

## 2025-03-05 DIAGNOSIS — R10.84 GENERALIZED ABDOMINAL PAIN: ICD-10-CM

## 2025-03-05 DIAGNOSIS — M79.2 NERVE PAIN: ICD-10-CM

## 2025-03-05 PROCEDURE — 72072 X-RAY EXAM THORAC SPINE 3VWS: CPT

## 2025-03-05 NOTE — TELEPHONE ENCOUNTER
Pain Management (recent referral) cannot get the pt in until June. Pt is asking for a referral to another office that may be able to get her in sooner.     Please advise.

## 2025-03-05 NOTE — TELEPHONE ENCOUNTER
Contacted patient she contacted 2/3 that she was given and will reach out to the last one and see. She will set up appt with who ever is sooner and let us know.

## 2025-03-11 ENCOUNTER — RESULTS FOLLOW-UP (OUTPATIENT)
Dept: GENERAL RADIOLOGY | Facility: CLINIC | Age: 80
End: 2025-03-11

## 2025-03-18 ENCOUNTER — HOSPITAL ENCOUNTER (OUTPATIENT)
Dept: MRI IMAGING | Facility: CLINIC | Age: 80
Discharge: HOME | End: 2025-03-20
Payer: COMMERCIAL

## 2025-03-18 DIAGNOSIS — M54.14 RADICULAR PAIN OF THORACIC REGION: ICD-10-CM

## 2025-03-18 PROCEDURE — 72146 MRI CHEST SPINE W/O DYE: CPT

## 2025-03-19 DIAGNOSIS — M79.2 NERVE PAIN: ICD-10-CM

## 2025-03-19 RX ORDER — GABAPENTIN 100 MG/1
100 CAPSULE ORAL 2 TIMES DAILY
Qty: 60 CAPSULE | Refills: 0 | OUTPATIENT
Start: 2025-03-19

## 2025-03-29 DIAGNOSIS — M79.2 NERVE PAIN: ICD-10-CM

## 2025-03-31 RX ORDER — GABAPENTIN 300 MG/1
300 CAPSULE ORAL 2 TIMES DAILY
Qty: 60 CAPSULE | Refills: 2 | Status: SHIPPED | OUTPATIENT
Start: 2025-03-31 | End: 2025-06-29

## 2025-03-31 NOTE — TELEPHONE ENCOUNTER
Yuki Winkler is requesting a refill on the following medication(s):  Requested Prescriptions     Pending Prescriptions Disp Refills    gabapentin (NEURONTIN) 300 MG capsule [Pharmacy Med Name: Gabapentin 300 MG Oral Capsule (Neurontin)] 60 capsule 0     Sig: Take 1 capsule by mouth 2 times daily.       Last Visit Date (If Applicable):  3/3/2025    Next Visit Date:    Visit date not found

## 2025-04-03 ENCOUNTER — HOSPITAL ENCOUNTER (EMERGENCY)
Age: 80
Discharge: HOME | End: 2025-04-03
Attending: EMERGENCY MEDICINE
Payer: COMMERCIAL

## 2025-04-03 ENCOUNTER — APPOINTMENT (OUTPATIENT)
Dept: GENERAL RADIOLOGY | Age: 80
End: 2025-04-03
Payer: COMMERCIAL

## 2025-04-03 ENCOUNTER — APPOINTMENT (OUTPATIENT)
Dept: CT IMAGING | Age: 80
End: 2025-04-03
Payer: COMMERCIAL

## 2025-04-03 VITALS
OXYGEN SATURATION: 91 % | SYSTOLIC BLOOD PRESSURE: 164 MMHG | HEART RATE: 78 BPM | DIASTOLIC BLOOD PRESSURE: 67 MMHG | WEIGHT: 191 LBS | BODY MASS INDEX: 36.09 KG/M2 | TEMPERATURE: 97.9 F | RESPIRATION RATE: 15 BRPM

## 2025-04-03 DIAGNOSIS — M54.6 ACUTE LEFT-SIDED THORACIC BACK PAIN: Primary | ICD-10-CM

## 2025-04-03 LAB
ANION GAP SERPL CALCULATED.3IONS-SCNC: 14 MMOL/L (ref 9–16)
BASOPHILS # BLD: 0.11 K/UL (ref 0–0.2)
BASOPHILS NFR BLD: 1 % (ref 0–2)
BUN SERPL-MCNC: 11 MG/DL (ref 8–23)
CALCIUM SERPL-MCNC: 9.7 MG/DL (ref 8.8–10.2)
CHLORIDE SERPL-SCNC: 99 MMOL/L (ref 98–107)
CO2 SERPL-SCNC: 26 MMOL/L (ref 20–31)
CREAT SERPL-MCNC: 0.6 MG/DL (ref 0.5–0.9)
EOSINOPHIL # BLD: 0.06 K/UL (ref 0–0.44)
EOSINOPHILS RELATIVE PERCENT: 0 % (ref 1–4)
ERYTHROCYTE [DISTWIDTH] IN BLOOD BY AUTOMATED COUNT: 13.9 % (ref 11.8–14.4)
GFR, ESTIMATED: 88 ML/MIN/1.73M2
GLUCOSE SERPL-MCNC: 133 MG/DL (ref 82–115)
HCT VFR BLD AUTO: 45.4 % (ref 36.3–47.1)
HGB BLD-MCNC: 15.3 G/DL (ref 11.9–15.1)
IMM GRANULOCYTES # BLD AUTO: 0.05 K/UL (ref 0–0.3)
IMM GRANULOCYTES NFR BLD: 0 %
LYMPHOCYTES NFR BLD: 2.52 K/UL (ref 1.1–3.7)
LYMPHOCYTES RELATIVE PERCENT: 17 % (ref 24–43)
MCH RBC QN AUTO: 31.2 PG (ref 25.2–33.5)
MCHC RBC AUTO-ENTMCNC: 33.7 G/DL (ref 28.4–34.8)
MCV RBC AUTO: 92.5 FL (ref 82.6–102.9)
MONOCYTES NFR BLD: 0.55 K/UL (ref 0.1–1.2)
MONOCYTES NFR BLD: 4 % (ref 3–12)
MYOGLOBIN SERPL-MCNC: 46 NG/ML (ref 25–58)
NEUTROPHILS NFR BLD: 78 % (ref 36–65)
NEUTS SEG NFR BLD: 11.39 K/UL (ref 1.5–8.1)
NRBC BLD-RTO: 0 PER 100 WBC
PLATELET # BLD AUTO: 393 K/UL (ref 138–453)
PMV BLD AUTO: 9.6 FL (ref 8.1–13.5)
POTASSIUM SERPL-SCNC: 4 MMOL/L (ref 3.7–5.3)
RBC # BLD AUTO: 4.91 M/UL (ref 3.95–5.11)
SODIUM SERPL-SCNC: 138 MMOL/L (ref 136–145)
TROPONIN I SERPL HS-MCNC: 13 NG/L (ref 0–14)
TROPONIN I SERPL HS-MCNC: 22 NG/L (ref 0–14)
WBC OTHER # BLD: 14.7 K/UL (ref 3.5–11.3)

## 2025-04-03 PROCEDURE — 96375 TX/PRO/DX INJ NEW DRUG ADDON: CPT

## 2025-04-03 PROCEDURE — 96374 THER/PROPH/DIAG INJ IV PUSH: CPT

## 2025-04-03 PROCEDURE — 72128 CT CHEST SPINE W/O DYE: CPT

## 2025-04-03 PROCEDURE — 6370000000 HC RX 637 (ALT 250 FOR IP): Performed by: EMERGENCY MEDICINE

## 2025-04-03 PROCEDURE — 80048 BASIC METABOLIC PNL TOTAL CA: CPT

## 2025-04-03 PROCEDURE — 83874 ASSAY OF MYOGLOBIN: CPT

## 2025-04-03 PROCEDURE — 84484 ASSAY OF TROPONIN QUANT: CPT

## 2025-04-03 PROCEDURE — 93005 ELECTROCARDIOGRAM TRACING: CPT | Performed by: EMERGENCY MEDICINE

## 2025-04-03 PROCEDURE — 71045 X-RAY EXAM CHEST 1 VIEW: CPT

## 2025-04-03 PROCEDURE — 99285 EMERGENCY DEPT VISIT HI MDM: CPT

## 2025-04-03 PROCEDURE — 6360000002 HC RX W HCPCS: Performed by: NURSE PRACTITIONER

## 2025-04-03 PROCEDURE — 85025 COMPLETE CBC W/AUTO DIFF WBC: CPT

## 2025-04-03 RX ORDER — ORPHENADRINE CITRATE 30 MG/ML
30 INJECTION INTRAMUSCULAR; INTRAVENOUS ONCE
Status: COMPLETED | OUTPATIENT
Start: 2025-04-03 | End: 2025-04-03

## 2025-04-03 RX ORDER — KETOROLAC TROMETHAMINE 15 MG/ML
15 INJECTION, SOLUTION INTRAMUSCULAR; INTRAVENOUS ONCE
Status: COMPLETED | OUTPATIENT
Start: 2025-04-03 | End: 2025-04-03

## 2025-04-03 RX ORDER — TRAMADOL HYDROCHLORIDE 50 MG/1
50 TABLET ORAL ONCE
Status: COMPLETED | OUTPATIENT
Start: 2025-04-03 | End: 2025-04-03

## 2025-04-03 RX ORDER — ONDANSETRON 2 MG/ML
4 INJECTION INTRAMUSCULAR; INTRAVENOUS ONCE
Status: COMPLETED | OUTPATIENT
Start: 2025-04-03 | End: 2025-04-03

## 2025-04-03 RX ORDER — MORPHINE SULFATE 2 MG/ML
2 INJECTION, SOLUTION INTRAMUSCULAR; INTRAVENOUS ONCE
Refills: 0 | Status: COMPLETED | OUTPATIENT
Start: 2025-04-03 | End: 2025-04-03

## 2025-04-03 RX ADMIN — ONDANSETRON 4 MG: 2 INJECTION, SOLUTION INTRAMUSCULAR; INTRAVENOUS at 12:45

## 2025-04-03 RX ADMIN — ORPHENADRINE CITRATE 30 MG: 60 INJECTION INTRAMUSCULAR; INTRAVENOUS at 14:29

## 2025-04-03 RX ADMIN — TRAMADOL HYDROCHLORIDE 50 MG: 50 TABLET, COATED ORAL at 18:52

## 2025-04-03 RX ADMIN — KETOROLAC TROMETHAMINE 15 MG: 15 INJECTION, SOLUTION INTRAMUSCULAR; INTRAVENOUS at 14:29

## 2025-04-03 RX ADMIN — MORPHINE SULFATE 2 MG: 2 INJECTION, SOLUTION INTRAMUSCULAR; INTRAVENOUS at 12:45

## 2025-04-03 ASSESSMENT — ENCOUNTER SYMPTOMS
BACK PAIN: 1
COLOR CHANGE: 0
ABDOMINAL PAIN: 0
COUGH: 0
SHORTNESS OF BREATH: 0

## 2025-04-03 ASSESSMENT — PAIN DESCRIPTION - LOCATION: LOCATION: CHEST

## 2025-04-03 ASSESSMENT — PAIN - FUNCTIONAL ASSESSMENT: PAIN_FUNCTIONAL_ASSESSMENT: 0-10

## 2025-04-03 ASSESSMENT — PAIN SCALES - GENERAL
PAINLEVEL_OUTOF10: 4
PAINLEVEL_OUTOF10: 10

## 2025-04-03 ASSESSMENT — PAIN DESCRIPTION - DESCRIPTORS: DESCRIPTORS: ACHING

## 2025-04-03 NOTE — ED NOTES
Patient arrived to ED with . Had a steroid injection in her back this morning prior to arrival. Patient started to have sudden onset of chest pain radiating to her back and sob. Patient denies hx of MI. HX Diabetes type II, HTN.

## 2025-04-03 NOTE — ED PROVIDER NOTES
EMERGENCY DEPARTMENT ENCOUNTER   ATTENDING ATTESTATION     Pt Name: Yuki Winkler  MRN: [id number]  Birthdate 1945  Date of evaluation: 4/3/25   Yuki Winkler is a 79 y.o. female with CC: Back Pain (Pt reports steroid injection in back at [facility]. States ever since she has had back pain radiating to left shoulder and tightness in chest.) and Chest Pain    MDM:   I performed a substantive part of the MDM during the patient's E/M visit. I personally evaluated and examined the patient. I personally made or approved the documented management plan and acknowledge its risk of complications.           CRITICAL CARE:       EKG: All EKG's are interpreted by the Emergency Department Physician who either signs or Co-signs this chart in the absence of a cardiologist.      EKG sinus rhythm ventricular rate 94  No significant ST or T wave changes  No STEMI criteria  Left axis deviation    QTc 437    RADIOLOGY:All plain film, CT, MRI, and formal ultrasound images (except ED bedside ultrasound) are read by the radiologist, see reports below, unless otherwise noted in MDM or here.  CT THORACIC SPINE WO CONTRAST   Final Result   1. No acute thoracic spine abnormality.   2. Multilevel spondylosis and degenerative disc disease.         XR CHEST PORTABLE   Final Result   No acute cardiopulmonary findings           LABS: All lab results were reviewed by myself, and all abnormals are listed below.  Labs Reviewed   CBC WITH AUTO DIFFERENTIAL - Abnormal; Notable for the following components:       Result Value    WBC 14.7 (*)     Hemoglobin 15.3 (*)     Neutrophils % 78 (*)     Lymphocytes % 17 (*)     Eosinophils % 0 (*)     Neutrophils Absolute 11.39 (*)     All other components within normal limits   BASIC METABOLIC PANEL - Abnormal; Notable for the following components:    Glucose 133 (*)     All other components within normal limits   TROP/MYOGLOBIN - Abnormal; Notable for the following components:    Troponin,

## 2025-04-03 NOTE — DISCHARGE INSTRUCTIONS
If pain significantly worsens or you have numbness weakness or tingling in your arms/legs I do recommend return to the emergency room.    Continue all home medications as prescribed.

## 2025-04-03 NOTE — ED PROVIDER NOTES
[facility][note 3[facility] EMERGENCY DEPARTMENT  eMERGENCY dEPARTMENT eNCOUnter      Pt Name: Yuki Winkler  MRN: [id number]  Birthdate 1945  Date of evaluation: 4/3/2025  Provider: DAY Carrillo - ABA    CHIEF COMPLAINT       Chief Complaint   Patient presents with    Back Pain     Pt reports steroid injection in back at . States ever since she has had back pain radiating to left shoulder and tightness in chest.    Chest Pain         HISTORY OF PRESENT ILLNESS  (Location/Symptom, Timing/Onset, Context/Setting, Quality, Duration, Modifying Factors, Severity.)   Yuki Winkler is a 79 y.o. female who presents to the emergency department. C/o pain to her left upper back. It radiates to her left shoulder and chest. Onset was this morning after receiving a thoracic epidural injection per Dr. Jones. Dr. Jones called the ED and reported there were no issues with the procedure. Pt reports the pain is worse with inspiration and movement. Rates her pain 10/10. She is accompanied by family.      Nursing Notes were reviewed.    ALLERGIES     Cardizem [diltiazem], Fenofibrate, Meperidine, Shrimp extract, Atenolol-chlorthalidone, Lipitor [atorvastatin], Metoprolol, Sitagliptin, Sitagliptin-metformin hcl, Sulfa antibiotics, Alatrofloxacin, Aluminum, and Dirithromycin    CURRENT MEDICATIONS       Previous Medications    AMLODIPINE (NORVASC) 2.5 MG TABLET    Take 1 tablet by mouth every morning    AMLODIPINE (NORVASC) 5 MG TABLET    Take 1 tablet by mouth daily    B COMPLEX VITAMINS CAPSULE    Take 1 capsule by mouth daily    B-D 3CC LUER-LUCITA SYR 23GX1\" 23G X 1\" 3 ML MISC    USE TO INJECT B12 AS DIRECTED    BASAGLAR KWIKPEN 100 UNIT/ML INJECTION PEN    Inject 45 Units into the skin nightly    BD PEN NEEDLE PREETHI 2ND GEN 32G X 4 MM MISC    USE AS DIRECTED three times a day    CHOLECALCIFEROL (VITAMIN D3) 250 MCG (14306 UT) TABS    Take by mouth    CLOBETASOL (TEMOVATE) 0.05 % CREAM    Apply topically

## 2025-04-04 LAB
EKG ATRIAL RATE: 94 BPM
EKG P AXIS: 46 DEGREES
EKG P-R INTERVAL: 166 MS
EKG Q-T INTERVAL: 350 MS
EKG QRS DURATION: 86 MS
EKG QTC CALCULATION (BAZETT): 437 MS
EKG R AXIS: -36 DEGREES
EKG T AXIS: 105 DEGREES
EKG VENTRICULAR RATE: 94 BPM

## 2025-04-24 ENCOUNTER — TELEPHONE (OUTPATIENT)
Dept: PRIMARY CARE CLINIC | Age: 80
End: 2025-04-24

## 2025-05-01 ENCOUNTER — HOSPITAL ENCOUNTER (OUTPATIENT)
Age: 80
Setting detail: SPECIMEN
Discharge: HOME | End: 2025-05-01

## 2025-05-01 ENCOUNTER — OFFICE VISIT (OUTPATIENT)
Dept: PRIMARY CARE CLINIC | Age: 80
End: 2025-05-01

## 2025-05-01 VITALS
HEART RATE: 91 BPM | DIASTOLIC BLOOD PRESSURE: 84 MMHG | SYSTOLIC BLOOD PRESSURE: 128 MMHG | BODY MASS INDEX: 36.44 KG/M2 | WEIGHT: 193 LBS | HEIGHT: 61 IN | OXYGEN SATURATION: 96 %

## 2025-05-01 DIAGNOSIS — M54.30 SCIATICA, UNSPECIFIED LATERALITY: ICD-10-CM

## 2025-05-01 DIAGNOSIS — Z00.00 MEDICARE ANNUAL WELLNESS VISIT, SUBSEQUENT: Primary | ICD-10-CM

## 2025-05-01 DIAGNOSIS — E53.8 B12 DEFICIENCY: ICD-10-CM

## 2025-05-01 DIAGNOSIS — Z13.220 ENCOUNTER FOR LIPID SCREENING FOR CARDIOVASCULAR DISEASE: ICD-10-CM

## 2025-05-01 DIAGNOSIS — E66.812 CLASS 2 SEVERE OBESITY DUE TO EXCESS CALORIES WITH SERIOUS COMORBIDITY AND BODY MASS INDEX (BMI) OF 36.0 TO 36.9 IN ADULT (HCC): ICD-10-CM

## 2025-05-01 DIAGNOSIS — Z13.6 ENCOUNTER FOR LIPID SCREENING FOR CARDIOVASCULAR DISEASE: ICD-10-CM

## 2025-05-01 DIAGNOSIS — Z79.4 TYPE 2 DIABETES MELLITUS WITHOUT COMPLICATION, WITH LONG-TERM CURRENT USE OF INSULIN (HCC): ICD-10-CM

## 2025-05-01 DIAGNOSIS — Z78.9 NEVER SMOKED TOBACCO: ICD-10-CM

## 2025-05-01 DIAGNOSIS — B02.29 POSTHERPETIC NEURALGIA: ICD-10-CM

## 2025-05-01 DIAGNOSIS — E11.9 TYPE 2 DIABETES MELLITUS WITHOUT COMPLICATION, WITH LONG-TERM CURRENT USE OF INSULIN (HCC): ICD-10-CM

## 2025-05-01 DIAGNOSIS — E55.9 VITAMIN D DEFICIENCY: ICD-10-CM

## 2025-05-01 DIAGNOSIS — E66.01 CLASS 2 SEVERE OBESITY DUE TO EXCESS CALORIES WITH SERIOUS COMORBIDITY AND BODY MASS INDEX (BMI) OF 36.0 TO 36.9 IN ADULT (HCC): ICD-10-CM

## 2025-05-01 LAB
25(OH)D3 SERPL-MCNC: 66.2 NG/ML (ref 30–100)
CHOLEST SERPL-MCNC: 201 MG/DL (ref 0–199)
CHOLESTEROL/HDL RATIO: 4.4
FOLATE SERPL-MCNC: 12.2 NG/ML (ref 4.8–24.2)
HDLC SERPL-MCNC: 46 MG/DL
LDLC SERPL CALC-MCNC: 107 MG/DL (ref 0–100)
TRIGL SERPL-MCNC: 239 MG/DL
VIT B12 SERPL-MCNC: 502 PG/ML (ref 232–1245)
VLDLC SERPL CALC-MCNC: 48 MG/DL (ref 1–30)

## 2025-05-01 RX ORDER — TRAMADOL HYDROCHLORIDE 50 MG/1
TABLET ORAL
COMMUNITY
Start: 2025-03-31

## 2025-05-01 RX ORDER — BISACODYL 5 MG/1
5 TABLET, DELAYED RELEASE ORAL
COMMUNITY
Start: 2025-02-12 | End: 2025-05-11

## 2025-05-01 ASSESSMENT — PATIENT HEALTH QUESTIONNAIRE - PHQ9
SUM OF ALL RESPONSES TO PHQ QUESTIONS 1-9: 0
2. FEELING DOWN, DEPRESSED OR HOPELESS: NOT AT ALL
1. LITTLE INTEREST OR PLEASURE IN DOING THINGS: NOT AT ALL
SUM OF ALL RESPONSES TO PHQ QUESTIONS 1-9: 0

## 2025-05-01 NOTE — PROGRESS NOTES
If no vision exam today, verify date of last eye exam- [facility]  [place] [address]  Phone: [phone]  Fax: [phone]    Medicare Annual Wellness Visit    Yuki Winkler is here for No chief complaint on file.    Assessment & Plan    {There are no diagnoses linked to this encounter. (Refresh or delete this SmartLink)}     Medicare Service Recommended Frequency Last Done Due next   Pneumonia vaccine After 50: Prevnar 20 ONCE. 8/19/2014-PPSV23  3/5/2020-PPSV23 PCV 20 due now   Influenza vaccine Yearly 10/22/2020 9/1/2025   Covid vaccine Yearly   **Every 6mo for higher risk patients** No record Due now   RSV vaccine After 75: ONCE  **After 60 if at increased risk.  No record Due now   Zoster/Shingles   After 50: Shingrix series: 2 shots, 2-6 months apart No record Due now   Tetanus vaccine (Td or Tdap) Every 10 years  No record Due now   Mammogram Age 40-74: Every 1-2 years  2/17/2023 Completed   Prostate Cancer Shared decision making with patient depending on family history and risk N/A N/A   Colorectal Cancer Screening Ages 45-75: FIT test yearly, Cologuard every 3 years, Colonoscopy every 10 years *unless otherwise indicated* 2/24/2025 Completed   Cardiovascular/cholesterol screening As determined by your physician ***  Total cholesterol: ***  Triglycerides: ***  HDL (good cholesterol): ***  LDL (bad cholesterol): *** As determined by your physician   Diabetes screening   As determined by your physician ***  A1c/glucose: *** As determined by your physician   Osteoporosis screening   Between 65-85: DEXA every 2 years for females *** ***   Screening for abdominal aortic aneurysm Males: Smoked >=100 cigarettes   Anyone:1st degree relative with abdominal aortic aneurysm.  One time screening between 65-74yo. *** ***   Low Dose CT/Lung Cancer  Between 50-80: Annually for anyone with 20pkyr history AND are currently smoking or have quit within the past 15yrs. ***

## 2025-05-01 NOTE — PROGRESS NOTES
[facility]  [address] [place] [address].  [place] [address]  Phone: [phone]  Fax: [phone]    Medicare Annual Wellness Visit    Yuki Winkler is here for Medicare AWV (Patient states she just got over internal shingles but she is having pain in her LT side abdomen, February on and off pain. ) and Other (MARINA used at last visit Dr. Ludwig)    1. Medicare annual wellness visit, subsequent  Comments:  - Declined Prevnar 20, COVID-19, and other recommended vaccines  - Has not had a DEXA scan, prefers to postpone  2. Postherpetic neuralgia  Assessment & Plan:  - Persistent pain from shingles, though improved  - Discontinued gabapentin and pain pills due to ineffectiveness and side effects  - Uses Tylenol for flare-ups, avoids ibuprofen due to concerns about blood pressure and kidney issues  3. Type 2 diabetes mellitus without complication, with long-term current use of insulin ([facility])  Assessment & Plan:  - Under endocrinologist care for diabetes management, upcoming appointment in June  - Advised to contact endocrinologist for A1c test order before next appointment  4. B12 deficiency  -     Vitamin B12 & Folate; Future  5. Vitamin D deficiency  -     Vitamin D 25 Hydroxy; Future  6. Sciatica, unspecified laterality  Comments:  - Burning back pain, suspected sciatica from previous surgeries  - MRI and CT scans showed no abnormalities  - Provided exercises for sciatica sx relief  7. Encounter for lipid screening for cardiovascular disease  -     Lipid Panel; Future  8. Class 2 severe obesity due to excess calories with serious comorbidity and body mass index (BMI) of 36.0 to 36.9 in adult ([facility])  9. Never smoked tobacco       Medicare Service Recommended Frequency Last Done Due next   Pneumonia vaccine After 50: Prevnar 20 ONCE. 8/19/2014-PPSV23  3/5/2020-PPSV23 PCV 20 due now   Influenza vaccine Yearly 10/22/2020 9/1/2025   Covid vaccine Yearly   **Every 6mo for higher risk patients** No record

## 2025-05-01 NOTE — PATIENT INSTRUCTIONS
Medicare Service Recommended Frequency Last Done Due next   Pneumonia vaccine After 50: Prevnar 20 ONCE. 8/19/2014-PPSV23  3/5/2020-PPSV23 PCV 20 due now   Influenza vaccine Yearly 10/22/2020 9/1/2025   Covid vaccine Yearly   **Every 6mo for higher risk patients** No record Due now   RSV vaccine After 75: ONCE  **After 60 if at increased risk.  No record Due now   Zoster/Shingles   After 50: Shingrix series: 2 shots, 2-6 months apart No record Due now   Tetanus vaccine (Td or Tdap) Every 10 years  No record Due now   Mammogram Age 40-74: Every 1-2 years  2/17/2023 Completed   Prostate Cancer Shared decision making with patient depending on family history and risk N/A N/A   Colorectal Cancer Screening Ages 45-75: FIT test yearly, Cologuard every 3 years, Colonoscopy every 10 years *unless otherwise indicated* 2/24/2025 Completed   Cardiovascular/cholesterol screening As determined by your physician 7/28/23  Total cholesterol: 178  Triglycerides: 227  HDL (good cholesterol): 40  LDL (bad cholesterol): 93 Due now   Diabetes screening   As determined by your physician 2/13/25  A1c: 7.8 Per endocrinology   Osteoporosis screening   Between 65-85: DEXA every 2 years for females No record Due now   Screening for abdominal aortic aneurysm Males: Smoked >=100 cigarettes   Anyone:1st degree relative with abdominal aortic aneurysm.  One time screening between 65-76yo. N/A N/A   Low Dose CT/Lung Cancer  Between 50-80: Annually for anyone with 20pkyr history AND are currently smoking or have quit within the past 15yrs. N/A N/A   Glaucoma screening       Covered yearly for those:  with diabetes mellitus  with a family history of glaucoma  -Americans aged 50 and older  -Americans aged 65 and older November 2024 Needs to schedule with optometrist          Learning About Being Active as an Older Adult  Why is being active important as you get older?     Being active is one of the best things you can do for your health.

## 2025-05-06 ENCOUNTER — RESULTS FOLLOW-UP (OUTPATIENT)
Dept: PRIMARY CARE CLINIC | Age: 80
End: 2025-05-06

## 2025-05-08 NOTE — TELEPHONE ENCOUNTER
----- Message from DAVID AKERS MA sent at 5/7/2025  8:50 AM EDT -----  Pt informed of results; does not want to start a statin at this time.

## 2025-05-11 PROBLEM — E11.9 TYPE 2 DIABETES MELLITUS WITHOUT COMPLICATION, WITH LONG-TERM CURRENT USE OF INSULIN (HCC): Status: ACTIVE | Noted: 2025-05-11

## 2025-05-11 PROBLEM — Z79.4 TYPE 2 DIABETES MELLITUS WITHOUT COMPLICATION, WITH LONG-TERM CURRENT USE OF INSULIN (HCC): Status: ACTIVE | Noted: 2025-05-11

## 2025-05-11 PROBLEM — B02.29 POSTHERPETIC NEURALGIA: Status: ACTIVE | Noted: 2025-05-11

## 2025-06-12 RX ORDER — PANTOPRAZOLE SODIUM 40 MG/1
40 TABLET, DELAYED RELEASE ORAL DAILY
Qty: 90 TABLET | Refills: 1 | Status: SHIPPED | OUTPATIENT
Start: 2025-06-12

## 2025-07-02 DIAGNOSIS — R10.84 GENERALIZED ABDOMINAL PAIN: ICD-10-CM

## 2025-07-02 DIAGNOSIS — B02.29 NEURALGIA, POSTHERPETIC: ICD-10-CM

## 2025-07-02 NOTE — TELEPHONE ENCOUNTER
Last Visit Date: 3/3/2025   Next Visit Date: Visit date not found     Pt is asking for a refill of Tramadol due to pain from shingles. She thought they had gone away, but she can still feel the pain from it.     Please advise.

## 2025-07-03 DIAGNOSIS — M79.2 NERVE PAIN: ICD-10-CM

## 2025-07-03 RX ORDER — GABAPENTIN 300 MG/1
300 CAPSULE ORAL 2 TIMES DAILY
Qty: 60 CAPSULE | Refills: 2 | Status: CANCELLED | OUTPATIENT
Start: 2025-07-03 | End: 2025-10-01

## 2025-07-03 RX ORDER — TRAMADOL HYDROCHLORIDE 50 MG/1
50 TABLET ORAL EVERY 6 HOURS PRN
Qty: 12 TABLET | Refills: 0 | Status: CANCELLED | OUTPATIENT
Start: 2025-07-03 | End: 2025-07-06

## 2025-07-03 NOTE — TELEPHONE ENCOUNTER
FYI:    Pt called requesting Tramadol again and also she would like the Gabapentin refilled but Does Not want the 300 mg she states that is too much for her and wants a lower dose.    Please advise.

## 2025-07-07 NOTE — TELEPHONE ENCOUNTER
Pt called again and is still waiting on Gabapentin and Tramadol prescriptions requested last week.    Pt states\"was a very difficult weekend\".    Please advise.

## 2025-07-08 RX ORDER — TRAMADOL HYDROCHLORIDE 50 MG/1
50 TABLET ORAL EVERY 8 HOURS PRN
Qty: 12 TABLET | Refills: 0 | Status: SHIPPED | OUTPATIENT
Start: 2025-07-08 | End: 2025-08-07

## 2025-07-08 RX ORDER — GABAPENTIN 100 MG/1
100 CAPSULE ORAL 3 TIMES DAILY
Qty: 90 CAPSULE | Refills: 0 | Status: SHIPPED | OUTPATIENT
Start: 2025-07-08 | End: 2025-08-07

## 2025-07-08 RX ORDER — GABAPENTIN 100 MG/1
100 CAPSULE ORAL 2 TIMES DAILY
Qty: 60 CAPSULE | Refills: 0 | OUTPATIENT
Start: 2025-07-08 | End: 2025-08-07

## 2025-07-08 NOTE — TELEPHONE ENCOUNTER
Pt last seen in 5/1/25- at that time, pt reported gabapentin and tramadol were ineffective and caused side effects; said her pain was improving and the meds weren't needed.     If something has changed, she should be evaluated to make sure Gabapentin/tramadol is the right med for mgmt.

## 2025-07-09 NOTE — TELEPHONE ENCOUNTER
1mo supply of gabapentin and tramadol supplied.     Gabapentin sent as 100mg tid, but pt can take it bid if that is more tolerable for her. Gabapentin is meant to be taken as a scheduled me (not prn)- notify pt that scheduled use will provide the most benefit.

## 2025-07-17 ENCOUNTER — OFFICE VISIT (OUTPATIENT)
Dept: PRIMARY CARE CLINIC | Age: 80
End: 2025-07-17
Payer: COMMERCIAL

## 2025-07-17 VITALS
BODY MASS INDEX: 35.87 KG/M2 | DIASTOLIC BLOOD PRESSURE: 70 MMHG | WEIGHT: 190 LBS | RESPIRATION RATE: 18 BRPM | SYSTOLIC BLOOD PRESSURE: 138 MMHG | HEART RATE: 78 BPM | OXYGEN SATURATION: 98 % | HEIGHT: 61 IN

## 2025-07-17 DIAGNOSIS — B02.29 NEURALGIA, POSTHERPETIC: ICD-10-CM

## 2025-07-17 PROCEDURE — 1159F MED LIST DOCD IN RCRD: CPT | Performed by: FAMILY MEDICINE

## 2025-07-17 PROCEDURE — 3078F DIAST BP <80 MM HG: CPT | Performed by: FAMILY MEDICINE

## 2025-07-17 PROCEDURE — 1123F ACP DISCUSS/DSCN MKR DOCD: CPT | Performed by: FAMILY MEDICINE

## 2025-07-17 PROCEDURE — 3075F SYST BP GE 130 - 139MM HG: CPT | Performed by: FAMILY MEDICINE

## 2025-07-17 PROCEDURE — 1125F AMNT PAIN NOTED PAIN PRSNT: CPT | Performed by: FAMILY MEDICINE

## 2025-07-17 PROCEDURE — 99214 OFFICE O/P EST MOD 30 MIN: CPT | Performed by: FAMILY MEDICINE

## 2025-07-17 RX ORDER — ACETAMINOPHEN AND CODEINE PHOSPHATE 300; 15 MG/1; MG/1
1 TABLET ORAL EVERY 8 HOURS PRN
Qty: 21 TABLET | Refills: 0 | Status: SHIPPED | OUTPATIENT
Start: 2025-07-17 | End: 2025-07-24

## 2025-07-17 RX ORDER — DULOXETIN HYDROCHLORIDE 20 MG/1
20 CAPSULE, DELAYED RELEASE ORAL DAILY
Qty: 30 CAPSULE | Refills: 1 | Status: SHIPPED | OUTPATIENT
Start: 2025-07-17

## 2025-07-17 NOTE — PROGRESS NOTES
[facility]  [address] [place] [id number]  Phone: [phone]       Name: Yuki Winkler  : 1945     Chief Complaint:    Yuki Winkler is a 79 y.o. year old female who presents today for   Chief Complaint   Patient presents with    Herpes Zoster     Internal-  lots of pain      Other     Praneeth ok     Medication Adjustment     For pain        History of Present Illness:    Notes from past visit(s) and/or chart review: patient had nerve pain several months ago. She believed this was due to shingles (no rash ever appeared). I put her on gabapentin and also referred to pain management. Looks like she had MRI and CT scans done. I do not have notes from pain management though.     Subjective   History of Present Illness  The patient is an 79-year-old female who presents for follow-up on her nerve pain, which has been ongoing for several months.    She sought help from a pain management specialist, where she received an injection in her tailbone. This procedure was not well-tolerated, leading to severe pain and breathing difficulties, necessitating an emergency room visit on 2025. The doctor was very concerned and caring, and her son took her to the emergency room. She was in the hospital all day, and the doctor came to see how she was doing. She had a bad experience and felt like her body rejected whatever they put in her. She will not go back there. She was treated for low back pain but not the side pain that she believes is post-herpetic neuralgia.     She takes tramadol and gabapentin, which she tried again 10 days ago at a dose of 100 mg. These medications alter her, and she has many pills at home that she plans to take to the pill drop off. Her symptoms subsided in May 2025 but returned on 2025, more severe than before. She reports no rash. She takes pain medication (tramadol) mostly at night as it impairs her daytime functioning and wears